# Patient Record
Sex: FEMALE | Race: ASIAN | NOT HISPANIC OR LATINO | Employment: PART TIME | URBAN - METROPOLITAN AREA
[De-identification: names, ages, dates, MRNs, and addresses within clinical notes are randomized per-mention and may not be internally consistent; named-entity substitution may affect disease eponyms.]

---

## 2019-03-25 ENCOUNTER — OFFICE VISIT (OUTPATIENT)
Dept: URGENT CARE | Facility: PHYSICIAN GROUP | Age: 54
End: 2019-03-25
Payer: COMMERCIAL

## 2019-03-25 VITALS
OXYGEN SATURATION: 98 % | BODY MASS INDEX: 22.68 KG/M2 | RESPIRATION RATE: 16 BRPM | HEIGHT: 63 IN | HEART RATE: 64 BPM | DIASTOLIC BLOOD PRESSURE: 70 MMHG | SYSTOLIC BLOOD PRESSURE: 124 MMHG | WEIGHT: 128 LBS | TEMPERATURE: 98.6 F

## 2019-03-25 DIAGNOSIS — J06.9 VIRAL UPPER RESPIRATORY TRACT INFECTION: ICD-10-CM

## 2019-03-25 LAB
INT CON NEG: NEGATIVE
INT CON POS: POSITIVE
S PYO AG THROAT QL: NORMAL

## 2019-03-25 PROCEDURE — 87880 STREP A ASSAY W/OPTIC: CPT | Performed by: PHYSICIAN ASSISTANT

## 2019-03-25 PROCEDURE — 99204 OFFICE O/P NEW MOD 45 MIN: CPT | Performed by: PHYSICIAN ASSISTANT

## 2019-03-25 RX ORDER — DEXTROMETHORPHAN HYDROBROMIDE AND PROMETHAZINE HYDROCHLORIDE 15; 6.25 MG/5ML; MG/5ML
5 SYRUP ORAL 4 TIMES DAILY PRN
Qty: 140 ML | Refills: 0 | Status: SHIPPED | OUTPATIENT
Start: 2019-03-25 | End: 2023-05-16

## 2019-03-25 RX ORDER — BENZONATATE 100 MG/1
100 CAPSULE ORAL 3 TIMES DAILY PRN
Qty: 30 CAP | Refills: 0 | Status: SHIPPED | OUTPATIENT
Start: 2019-03-25 | End: 2023-05-16

## 2019-03-25 ASSESSMENT — ENCOUNTER SYMPTOMS
DIARRHEA: 0
EYE PAIN: 0
CONSTIPATION: 0
EYE DISCHARGE: 0
HEADACHES: 0
SINUS PAIN: 0
SORE THROAT: 1
SPUTUM PRODUCTION: 1
VOMITING: 0
CHILLS: 0
MYALGIAS: 0
ABDOMINAL PAIN: 0
FEVER: 0
NAUSEA: 0
COUGH: 1
SHORTNESS OF BREATH: 0

## 2019-03-25 NOTE — PROGRESS NOTES
Subjective:   Mavis Langston is a 54 y.o. female who presents for Sore Throat (loss of voice, cough, congestion, X 2 days )       URI    This is a new problem. Episode onset: 2 days ago. The problem has been gradually worsening. There has been no fever. Associated symptoms include congestion, coughing and a sore throat. Pertinent negatives include no abdominal pain, chest pain, diarrhea, dysuria, ear pain, headaches, nausea, plugged ear sensation, sinus pain or vomiting. Treatments tried: OTC cold medication. The treatment provided mild relief.     Review of Systems   Constitutional: Negative for chills and fever.   HENT: Positive for congestion and sore throat. Negative for ear discharge, ear pain and sinus pain.    Eyes: Negative for pain and discharge.   Respiratory: Positive for cough and sputum production. Negative for shortness of breath.    Cardiovascular: Negative for chest pain.   Gastrointestinal: Negative for abdominal pain, constipation, diarrhea, nausea and vomiting.   Genitourinary: Negative for dysuria.   Musculoskeletal: Negative for myalgias.   Neurological: Negative for headaches.   All other systems reviewed and are negative.      PMH:  has no past medical history on file.    MEDS:   Current Outpatient Prescriptions:   •  benzonatate (TESSALON) 100 MG Cap, Take 1 Cap by mouth 3 times a day as needed for Cough., Disp: 30 Cap, Rfl: 0  •  promethazine-dextromethorphan (PROMETHAZINE-DM) 6.25-15 MG/5ML syrup, Take 5 mL by mouth 4 times a day as needed for Cough., Disp: 140 mL, Rfl: 0  •  ibuprofen (MOTRIN) 200 MG TABS, Take 200 mg by mouth every 6 hours as needed., Disp: , Rfl:   •  Budesonide-Formoterol Fumarate (SYMBICORT INH), Inhale  by mouth., Disp: , Rfl:   •  hydrocod polst-chlorphen polst (TUSSIONEX PENNKINETIC ER) 10-8 MG/5ML LQCR, Take 5 mL by mouth every 12 hours., Disp: 120 mL, Rfl: 0    ALLERGIES: No Known Allergies    SURGHX: History reviewed. No pertinent surgical history.    SOCHX:   "reports that she has never smoked. She has never used smokeless tobacco.    FH: Reviewed with patient, not pertinent to this visit.     Objective:   /70   Pulse 64   Temp 37 °C (98.6 °F)   Resp 16   Ht 1.6 m (5' 3\")   Wt 58.1 kg (128 lb)   SpO2 98%   BMI 22.67 kg/m²   Physical Exam   Constitutional: She is oriented to person, place, and time. She appears well-developed and well-nourished. No distress.   HENT:   Head: Normocephalic and atraumatic.   Right Ear: Tympanic membrane, external ear and ear canal normal.   Left Ear: Tympanic membrane, external ear and ear canal normal.   Nose: Mucosal edema present. Right sinus exhibits no maxillary sinus tenderness and no frontal sinus tenderness. Left sinus exhibits no maxillary sinus tenderness and no frontal sinus tenderness.   Mouth/Throat: Uvula is midline and mucous membranes are normal. Posterior oropharyngeal erythema present. No oropharyngeal exudate or posterior oropharyngeal edema.   Patient is whispering as she has lost her voice   Eyes: Pupils are equal, round, and reactive to light. Conjunctivae and EOM are normal.   Neck: Normal range of motion. Neck supple. No tracheal deviation present.   Cardiovascular: Normal rate, regular rhythm and normal heart sounds.    Pulmonary/Chest: Effort normal and breath sounds normal. No respiratory distress. She has no wheezes. She has no rhonchi. She has no rales.   Musculoskeletal:   ROM normal all four extremities   Lymphadenopathy:     She has no cervical adenopathy.   Neurological: She is alert and oriented to person, place, and time.   Skin: Skin is warm and dry.   Psychiatric: She has a normal mood and affect. Her behavior is normal. Judgment and thought content normal.   Vitals reviewed.    - POCT Rapid Strep A: negative    Assessment/Plan:   1. Viral upper respiratory tract infection  - POCT Rapid Strep A  - benzonatate (TESSALON) 100 MG Cap; Take 1 Cap by mouth 3 times a day as needed for Cough.  " Dispense: 30 Cap; Refill: 0  - promethazine-dextromethorphan (PROMETHAZINE-DM) 6.25-15 MG/5ML syrup; Take 5 mL by mouth 4 times a day as needed for Cough.  Dispense: 140 mL; Refill: 0    - Advised to try OTC mucinex steroid nasal spray, warm fluids, vocal rest, saline gargles prn  - Work note given  - Advised to return if symptoms worsen or do not improve    Differential diagnosis, natural history, supportive care, and indications for immediate follow-up discussed.

## 2019-03-25 NOTE — LETTER
March 25, 2019         Patient: Mavis Langston   YOB: 1965   Date of Visit: 3/25/2019           To Whom it May Concern:    Mavis Langston was seen in my clinic on 3/25/2019. Please excuse her from work from 3/24/19 through 3/26/19.    If you have any questions or concerns, please don't hesitate to call.        Sincerely,           Harvinder Wheeler  Electronically Signed

## 2019-04-29 ENCOUNTER — HOSPITAL ENCOUNTER (OUTPATIENT)
Dept: RADIOLOGY | Facility: MEDICAL CENTER | Age: 54
End: 2019-04-29

## 2019-05-09 ENCOUNTER — HOSPITAL ENCOUNTER (OUTPATIENT)
Dept: RADIOLOGY | Facility: MEDICAL CENTER | Age: 54
End: 2019-05-09
Attending: NURSE PRACTITIONER
Payer: COMMERCIAL

## 2019-05-09 DIAGNOSIS — Z12.31 BREAST CANCER SCREENING BY MAMMOGRAM: ICD-10-CM

## 2019-05-09 PROCEDURE — 77063 BREAST TOMOSYNTHESIS BI: CPT

## 2019-05-14 ENCOUNTER — HOSPITAL ENCOUNTER (OUTPATIENT)
Dept: RADIOLOGY | Facility: MEDICAL CENTER | Age: 54
End: 2019-05-14

## 2019-11-01 ENCOUNTER — APPOINTMENT (OUTPATIENT)
Dept: RADIOLOGY | Facility: MEDICAL CENTER | Age: 54
End: 2019-11-01
Attending: NURSE PRACTITIONER
Payer: COMMERCIAL

## 2019-11-01 DIAGNOSIS — M79.672 LEFT FOOT PAIN: ICD-10-CM

## 2019-11-01 PROCEDURE — 73718 MRI LOWER EXTREMITY W/O DYE: CPT | Mod: LT

## 2020-05-29 ENCOUNTER — HOSPITAL ENCOUNTER (OUTPATIENT)
Facility: MEDICAL CENTER | Age: 55
End: 2020-05-29
Payer: COMMERCIAL

## 2020-06-02 LAB
SARS-COV-2 RNA SPEC QL NAA+PROBE: NOT DETECTED
SPECIMEN SOURCE: NORMAL

## 2022-08-23 ENCOUNTER — HOSPITAL ENCOUNTER (OUTPATIENT)
Dept: RADIOLOGY | Facility: MEDICAL CENTER | Age: 57
End: 2022-08-23
Attending: NURSE PRACTITIONER
Payer: COMMERCIAL

## 2022-08-23 DIAGNOSIS — Z12.31 VISIT FOR SCREENING MAMMOGRAM: ICD-10-CM

## 2022-08-23 PROCEDURE — 77063 BREAST TOMOSYNTHESIS BI: CPT

## 2023-01-05 ENCOUNTER — OFFICE VISIT (OUTPATIENT)
Dept: URGENT CARE | Facility: PHYSICIAN GROUP | Age: 58
End: 2023-01-05
Payer: COMMERCIAL

## 2023-01-05 VITALS
OXYGEN SATURATION: 97 % | DIASTOLIC BLOOD PRESSURE: 80 MMHG | WEIGHT: 130 LBS | TEMPERATURE: 98.5 F | RESPIRATION RATE: 14 BRPM | BODY MASS INDEX: 23.92 KG/M2 | HEIGHT: 62 IN | SYSTOLIC BLOOD PRESSURE: 122 MMHG | HEART RATE: 96 BPM

## 2023-01-05 DIAGNOSIS — R68.89 FLU-LIKE SYMPTOMS: ICD-10-CM

## 2023-01-05 LAB
EXTERNAL QUALITY CONTROL: NORMAL
FLUAV+FLUBV AG SPEC QL IA: NORMAL
INT CON NEG: NORMAL
INT CON POS: NORMAL
S PYO AG THROAT QL: NEGATIVE
SARS-COV+SARS-COV-2 AG RESP QL IA.RAPID: NEGATIVE

## 2023-01-05 PROCEDURE — 87880 STREP A ASSAY W/OPTIC: CPT | Performed by: PHYSICIAN ASSISTANT

## 2023-01-05 PROCEDURE — 87426 SARSCOV CORONAVIRUS AG IA: CPT | Performed by: PHYSICIAN ASSISTANT

## 2023-01-05 PROCEDURE — 87804 INFLUENZA ASSAY W/OPTIC: CPT | Performed by: PHYSICIAN ASSISTANT

## 2023-01-05 PROCEDURE — 99203 OFFICE O/P NEW LOW 30 MIN: CPT | Performed by: PHYSICIAN ASSISTANT

## 2023-01-05 ASSESSMENT — ENCOUNTER SYMPTOMS
FEVER: 0
CHILLS: 1
COUGH: 1
HEADACHES: 1
MYALGIAS: 1
SORE THROAT: 1

## 2023-01-05 NOTE — LETTER
January 5, 2023         Patient: Mavis Langston   YOB: 1965   Date of Visit: 1/5/2023           To Whom it May Concern:    Mavis Langston was seen in my clinic on 1/5/2023.  Please excuse her absence through 1/8/2023.    If you have any questions or concerns, please don't hesitate to call.        Sincerely,           Rodney Moore P.A.-C.  Electronically Signed

## 2023-01-06 NOTE — PROGRESS NOTES
"  Subjective:   Mavis Langston is a 57 y.o. female who presents today with   Chief Complaint   Patient presents with    Sore Throat     Headaches, coughing, chills. Body aches      Pharyngitis   This is a new problem. The current episode started yesterday. The problem has been unchanged. There has been no fever. Associated symptoms include coughing and headaches. Associated symptoms comments: Body aches, Chills. She has tried nothing for the symptoms. The treatment provided no relief.     PMH:  has no past medical history on file.  MEDS:   Current Outpatient Medications:     benzonatate (TESSALON) 100 MG Cap, Take 1 Cap by mouth 3 times a day as needed for Cough., Disp: 30 Cap, Rfl: 0    promethazine-dextromethorphan (PROMETHAZINE-DM) 6.25-15 MG/5ML syrup, Take 5 mL by mouth 4 times a day as needed for Cough., Disp: 140 mL, Rfl: 0    ibuprofen (MOTRIN) 200 MG TABS, Take 200 mg by mouth every 6 hours as needed., Disp: , Rfl:     Budesonide-Formoterol Fumarate (SYMBICORT INH), Inhale  by mouth., Disp: , Rfl:     hydrocod polst-chlorphen polst (TUSSIONEX PENNKINETIC ER) 10-8 MG/5ML LQCR, Take 5 mL by mouth every 12 hours., Disp: 120 mL, Rfl: 0  ALLERGIES: No Known Allergies  SURGHX: History reviewed. No pertinent surgical history.  SOCHX:  reports that she has never smoked. She has never used smokeless tobacco.  FH: Reviewed with patient, not pertinent to this visit.     Review of Systems   Constitutional:  Positive for chills. Negative for fever.   HENT:  Positive for sore throat.    Respiratory:  Positive for cough.    Musculoskeletal:  Positive for myalgias.   Neurological:  Positive for headaches.      Objective:   /80 (BP Location: Right arm, Patient Position: Sitting, BP Cuff Size: Adult)   Pulse 96   Temp 36.9 °C (98.5 °F) (Temporal)   Resp 14   Ht 1.575 m (5' 2\")   Wt 59 kg (130 lb)   SpO2 97%   BMI 23.78 kg/m²   Physical Exam  Vitals and nursing note reviewed.   Constitutional:       General: She " is not in acute distress.     Appearance: Normal appearance. She is well-developed. She is not ill-appearing or toxic-appearing.   HENT:      Head: Normocephalic and atraumatic.      Right Ear: Hearing normal.      Left Ear: Hearing normal.   Cardiovascular:      Rate and Rhythm: Normal rate and regular rhythm.      Heart sounds: Normal heart sounds.   Pulmonary:      Effort: Pulmonary effort is normal.      Breath sounds: Normal breath sounds.   Musculoskeletal:      Comments: Normal movement in all 4 extremities   Skin:     General: Skin is warm and dry.   Neurological:      Mental Status: She is alert.      Coordination: Coordination normal.   Psychiatric:         Mood and Affect: Mood normal.     STREP A -  FLU -  COVID -    Assessment/Plan:   Assessment    1. Flu-like symptoms  - POCT Rapid Strep A  - POCT Influenza A/B  - POCT SARS-COV Antigen ANDRES (Symptomatic only)  Symptoms and presentation consistent with viral illness and we will rule out COVID at this time.  Vital signs are stable on exam today.  Discussed CDC guidelines including self isolation at home.   Patient encouraged to get plenty of rest, use OTC tylenol for pain/fever, and drink plenty of fluids.  Discussed with patient I would recommend repeating at home COVID test as she is still within the first couple of days and test could be false negative at this time.  Patient states she will do so over the weekend.    Differential diagnosis, natural history, supportive care, and indications for immediate follow-up discussed.   Patient given instructions and understanding of medications and treatment.    If not improving in 3-5 days, F/U with PCP or return to  if symptoms worsen.    Patient agreeable to plan.      Please note that this dictation was created using voice recognition software. I have made every reasonable attempt to correct obvious errors, but I expect that there are errors of grammar and possibly content that I did not discover before  finalizing the note.    Rodney Moore PA-C

## 2023-05-16 PROBLEM — S83.231A COMPLEX TEAR OF MEDIAL MENISCUS OF RIGHT KNEE AS CURRENT INJURY: Status: ACTIVE | Noted: 2023-05-16

## 2024-05-14 ENCOUNTER — HOSPITAL ENCOUNTER (OUTPATIENT)
Dept: RADIOLOGY | Facility: MEDICAL CENTER | Age: 59
End: 2024-05-14
Attending: NURSE PRACTITIONER
Payer: COMMERCIAL

## 2024-05-14 DIAGNOSIS — Z12.31 ENCOUNTER FOR SCREENING MAMMOGRAM FOR MALIGNANT NEOPLASM OF BREAST: ICD-10-CM
